# Patient Record
Sex: FEMALE | Race: BLACK OR AFRICAN AMERICAN | NOT HISPANIC OR LATINO | URBAN - METROPOLITAN AREA
[De-identification: names, ages, dates, MRNs, and addresses within clinical notes are randomized per-mention and may not be internally consistent; named-entity substitution may affect disease eponyms.]

---
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
## 2025-01-13 NOTE — ED PROVIDER NOTE - GASTROINTESTINAL NEGATIVE STATEMENT, MLM
no abdominal pain, no bloating, no constipation, no diarrhea, no nausea and no vomiting. normal/clear to auscultation bilaterally/no wheezes/no rales/no rhonchi/no respiratory distress/breath sounds equal

## 2025-01-15 NOTE — BH INPATIENT PSYCHIATRY PROGRESS NOTE - NSICDXBHSECONDARYDX_PSY_ALL_CORE
Last appointment: 12/18/2024  Next appointment: 2/5/2025        Last refill: 11/4/24    
Attention deficit hyperactivity disorder (ADHD)   F90.9  
no
Attention deficit hyperactivity disorder (ADHD)   F90.9

## 2025-06-10 NOTE — BH SAFETY PLAN - LOCAL URGENT CARE NAME
Pt would like STD check-pt states she was informed by partner last week that he was positive for Trich. Pt would like STD testing. Pt does have white vaginal discharge.    [facility], [facility]al [facility] Urgent [facility]

## 2025-08-26 PROCEDURE — 99214 OFFICE O/P EST MOD 30 MIN: CPT

## 2025-08-26 PROCEDURE — 90833 PSYTX W PT W E/M 30 MIN: CPT
